# Patient Record
Sex: FEMALE | Race: WHITE | NOT HISPANIC OR LATINO
[De-identification: names, ages, dates, MRNs, and addresses within clinical notes are randomized per-mention and may not be internally consistent; named-entity substitution may affect disease eponyms.]

---

## 2017-01-25 ENCOUNTER — RX RENEWAL (OUTPATIENT)
Age: 62
End: 2017-01-25

## 2017-01-27 ENCOUNTER — LABORATORY RESULT (OUTPATIENT)
Age: 62
End: 2017-01-27

## 2017-04-04 ENCOUNTER — RX RENEWAL (OUTPATIENT)
Age: 62
End: 2017-04-04

## 2017-04-06 ENCOUNTER — MEDICATION RENEWAL (OUTPATIENT)
Age: 62
End: 2017-04-06

## 2017-04-06 DIAGNOSIS — E83.39 OTHER DISORDERS OF PHOSPHORUS METABOLISM: ICD-10-CM

## 2017-04-24 ENCOUNTER — MEDICATION RENEWAL (OUTPATIENT)
Age: 62
End: 2017-04-24

## 2017-04-24 DIAGNOSIS — I12.0 HYPERTENSIVE CHRONIC KIDNEY DISEASE WITH STAGE 5 CHRONIC KIDNEY DISEASE OR END STAGE RENAL DISEASE: ICD-10-CM

## 2017-04-24 DIAGNOSIS — N18.6 HYPERTENSIVE CHRONIC KIDNEY DISEASE WITH STAGE 5 CHRONIC KIDNEY DISEASE OR END STAGE RENAL DISEASE: ICD-10-CM

## 2017-04-24 RX ORDER — HYDRALAZINE HYDROCHLORIDE 25 MG/1
25 TABLET ORAL 3 TIMES DAILY
Qty: 90 | Refills: 3 | Status: ACTIVE | COMMUNITY
Start: 2017-04-24 | End: 1900-01-01

## 2017-05-01 ENCOUNTER — MEDICATION RENEWAL (OUTPATIENT)
Age: 62
End: 2017-05-01

## 2017-05-08 ENCOUNTER — MEDICATION RENEWAL (OUTPATIENT)
Age: 62
End: 2017-05-08

## 2017-05-31 ENCOUNTER — MEDICATION RENEWAL (OUTPATIENT)
Age: 62
End: 2017-05-31

## 2017-08-09 ENCOUNTER — LABORATORY RESULT (OUTPATIENT)
Age: 62
End: 2017-08-09

## 2017-08-21 ENCOUNTER — RX RENEWAL (OUTPATIENT)
Age: 62
End: 2017-08-21

## 2017-09-24 DIAGNOSIS — E83.51 HYPOCALCEMIA: ICD-10-CM

## 2017-09-24 DIAGNOSIS — N18.6 END STAGE RENAL DISEASE: ICD-10-CM

## 2017-09-24 DIAGNOSIS — Z99.2 END STAGE RENAL DISEASE: ICD-10-CM

## 2017-10-16 ENCOUNTER — LABORATORY RESULT (OUTPATIENT)
Age: 62
End: 2017-10-16

## 2017-10-16 PROBLEM — E83.51 HYPOCALCEMIA: Status: ACTIVE | Noted: 2017-10-16

## 2017-10-17 LAB
24R-OH-CALCIDIOL SERPL-MCNC: 11.5 PG/ML
25(OH)D3 SERPL-MCNC: 31.6 NG/ML

## 2017-10-23 ENCOUNTER — RX RENEWAL (OUTPATIENT)
Age: 62
End: 2017-10-23

## 2017-12-20 ENCOUNTER — MEDICATION RENEWAL (OUTPATIENT)
Age: 62
End: 2017-12-20

## 2018-01-02 ENCOUNTER — RX RENEWAL (OUTPATIENT)
Age: 63
End: 2018-01-02

## 2018-03-12 ENCOUNTER — RX RENEWAL (OUTPATIENT)
Age: 63
End: 2018-03-12

## 2018-05-05 ENCOUNTER — RX RENEWAL (OUTPATIENT)
Age: 63
End: 2018-05-05

## 2018-07-18 ENCOUNTER — LABORATORY RESULT (OUTPATIENT)
Age: 63
End: 2018-07-18

## 2018-09-20 ENCOUNTER — APPOINTMENT (OUTPATIENT)
Dept: OBGYN | Facility: CLINIC | Age: 63
End: 2018-09-20

## 2019-03-04 ENCOUNTER — MEDICATION RENEWAL (OUTPATIENT)
Age: 64
End: 2019-03-04

## 2019-03-21 ENCOUNTER — MEDICATION RENEWAL (OUTPATIENT)
Age: 64
End: 2019-03-21

## 2019-04-09 ENCOUNTER — APPOINTMENT (OUTPATIENT)
Dept: ENDOCRINOLOGY | Facility: CLINIC | Age: 64
End: 2019-04-09
Payer: MEDICARE

## 2019-04-09 VITALS
WEIGHT: 108 LBS | SYSTOLIC BLOOD PRESSURE: 122 MMHG | HEIGHT: 62.5 IN | BODY MASS INDEX: 19.38 KG/M2 | HEART RATE: 72 BPM | DIASTOLIC BLOOD PRESSURE: 62 MMHG | OXYGEN SATURATION: 98 %

## 2019-04-09 DIAGNOSIS — E89.0 POSTPROCEDURAL HYPOTHYROIDISM: ICD-10-CM

## 2019-04-09 DIAGNOSIS — N25.81 SECONDARY HYPERPARATHYROIDISM OF RENAL ORIGIN: ICD-10-CM

## 2019-04-09 PROCEDURE — 99205 OFFICE O/P NEW HI 60 MIN: CPT

## 2019-04-09 NOTE — ASSESSMENT
[Denosumab Therapy] : Risks  and benefits of denosumab therapy were discussed with the patient including eczema, cellulitis, osteonecrosis of the jaw and atypical femur fractures [FreeTextEntry1] : 63 year old female with \par \par 1. 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 Pt had L renal transplant in 2006 and failure in 2015. Pt was previously on chronic steroids with AVN of hip and THR in 2014. No h/o fx. Pt was started on dialysis after failure, being managed by Dr. Mckeon. March 2016 BMD was significantly low but at that time her kidney doctor deferred her treatment. BMD Nov 2018 is severe. Pt believes she was distantly treated for osteoporosis but she does not remember any of the details. She has had 2 CVA with memory loss. Pt ambulates with a 4 point walker. Pt is in PT 2x/wk for balance. Due to kidney disease; secondary hyperparathyroidism.  . Pt gets infusions of Vit D. \par \par Pt is at high risk for future fx and should be treated with medical therapy. Pt most likely has high-bone turnover suggested by high alk phos. I have requested that any recent vitamin D levels be sent for review. Pt most likely needs higher dose active vitamin D. Prolia would be the preferred drug for high-turnover osteoporosis once calcium is improved. \par \par Bone biopsy is the definitive diagnostic tool to assess bone health in ESRD but is  often not recommended with as labs can be done to characterize bone cell activity. I would request BSAP if not done recently as the best validated test in this setting; CTX and P!NP are less reliable in dialysis. \par \par 2. Hypothyroidism: pt had thyroidectomy for benign goiter in 2009 and is on an unusual regimen of Cytomel qd. This does not appear adequate for her as her labs are not well controlled. I would recommend pt transition to standard care with LT4 50. \par \par f/u TBD. \par Aly M, Leonel MCGEE, Evenkyleoecatarina P, et al. Executive summary of the 2017 KDIGO Chronic Kidney Disease-Mineral and Bone Disorder (CKD-MBD) Guideline Update: what's changed and why it matters. Kidney Int. 2017;92:26-36.  [Levothyroxine] : The patient was instructed to take Levothyroxine on an empty stomach, separate from vitamins, and wait at least 30 minutes before eating

## 2019-04-09 NOTE — HISTORY OF PRESENT ILLNESS
[Vitamin D (supplements)] : Vitamin D as a dietary supplement [Patient taking Meds Correctly] : Patient is taking meds correctly [Taking Steroids] : a history of taking steroids [Uses a Walker/Cane] : use of a walker/cane [Regular Dental Follow-Up] : regular dental follow-up [High Fall Risk] : high fall risk [FreeTextEntry1] : Ms. VERMA is a 63 year old female being referred today for severe osteoporosis. 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 Pt had L renal transplant in 2006 and failure in 2015. Pt was started on dialysis being managed by Dr. Mckeon. Pt was previously on chronic steroids with AVN of hip and THR in 2014. Secondary hyperparathyroidism; Ca 8.0, phos 6.9, , alk phos 167, no BSAP listed, no vitamin D levels submitted . On vit D with dialysis, no details available. \par \par  March 2016 BMD was significantly low but at that time her kidney doctor deferred her treatment. BMD Nov 2018 fem neck:-3.6 tot hip:-4.6 spine read as:-1.8 suspect arthritis. Pt believes she was distantly treated for osteoporosis but she does not remember any of the details. Pt ambulates with a 4 point walker. No h/o fx. No fhx of osteoporosis. Pt gets infusions of Vit D. Pt is in PT 2x/wk. \par h/o AVN and hip replacement felt due to chronic steroids but no h/o osteoporosis-related fractures. \par Endocrine hx notable for thyroidectomy for benign goiter in 2009 and is on an unusual regimen of Cytomel qd. \par \par Pt had two CVA with decreased memory. Pt was in a coma ~2016 due to pneumonia and then again with shingles and encephalitis (2 recurrences of this). \par \par Heart disease; had stents placed in 2012. Previously wore a wearable defibrillator. No current CP.  [Family History of Osteoporosis] : no family history of osteoporosis [Hyperparathyroidism] : hyperparathyroidism [Family History of Hip Fracture] : no family history of hip fracture [History of Radiation Therapy] : no history of radiation therapy [Previous Fragility Fracture] : no previous fragility fracture

## 2019-04-09 NOTE — REVIEW OF SYSTEMS
[Negative] : Heme/Lymph [Shortness Of Breath] : shortness of breath [Confusion] : confusion [Difficulty Walking] : difficulty walking [Poor Balance] : poor balance [FreeTextEntry5] : HTN

## 2019-04-09 NOTE — PHYSICAL EXAM
[Alert] : alert [No Acute Distress] : no acute distress [Well Nourished] : well nourished [Normal Sclera/Conjunctiva] : normal sclera/conjunctiva [EOMI] : extra ocular movement intact [No Proptosis] : no proptosis [Normal Oropharynx] : the oropharynx was normal [Thyroid Not Enlarged] : the thyroid was not enlarged [No Respiratory Distress] : no respiratory distress [No Accessory Muscle Use] : no accessory muscle use [Clear to Auscultation] : lungs were clear to auscultation bilaterally [Normal Rate] : heart rate was normal  [Normal S1, S2] : normal S1 and S2 [Regular Rhythm] : with a regular rhythm [Normal Bowel Sounds] : normal bowel sounds [Not Tender] : non-tender [Soft] : abdomen soft [Not Distended] : not distended [Post Cervical Nodes] : posterior cervical nodes [Anterior Cervical Nodes] : anterior cervical nodes [Axillary Nodes] : axillary nodes [Normal] : normal and non tender [No Spinal Tenderness] : no spinal tenderness [No Stigmata of Cushings Syndrome] : no stigmata of cushings syndrome [Normal Strength/Tone] : muscle strength and tone were normal [No Rash] : no rash [Normal Reflexes] : deep tendon reflexes were 2+ and symmetric [Oriented x3] : oriented to person, place, and time [Kyphosis] : kyphosis present [Well Healed Scar] : well healed scar [Acanthosis Nigricans] : no acanthosis nigricans [de-identified] : using wheeled walker, unable to climb to exam table without assistance [de-identified] : decreased truncal ht [de-identified] : poor memory

## 2019-04-09 NOTE — CONSULT LETTER
[Consult Letter:] : I had the pleasure of evaluating your patient, [unfilled]. [Please see my note below.] : Please see my note below. [Consult Closing:] : Thank you very much for allowing me to participate in the care of this patient.  If you have any questions, please do not hesitate to contact me. [Sincerely,] : Sincerely, [Dear  ___] : Dear  [unfilled], [FreeTextEntry2] : Isra Brunner MD\par [address] [facility] [address], \par [place] [address] [DrDylan  ___] : Dr. BYRNE

## 2019-04-09 NOTE — END OF VISIT
[FreeTextEntry3] : I, Vicki De La Paz, authored this note working as a medical scribe for Dr. Chauhan.  04/09/2019.  1:15PM. \par This note was authored by Vicki De La Paz working as medical scribe for me. I have reviewed, edited, and revised the note as needed. I am in agreement with the exam findings, imaging findings, and treatment plan.  Pb Chauhan MD

## 2019-04-12 ENCOUNTER — OTHER (OUTPATIENT)
Age: 64
End: 2019-04-12

## 2019-04-12 DIAGNOSIS — M81.0 AGE-RELATED OSTEOPOROSIS W/OUT CURRENT PATHOLOGICAL FRACTURE: ICD-10-CM

## 2019-05-28 ENCOUNTER — RX RENEWAL (OUTPATIENT)
Age: 64
End: 2019-05-28

## 2019-05-28 RX ORDER — ALLOPURINOL 100 MG/1
100 TABLET ORAL
Qty: 90 | Refills: 3 | Status: ACTIVE | COMMUNITY
Start: 2017-12-20 | End: 1900-01-01

## 2019-08-22 ENCOUNTER — MEDICATION RENEWAL (OUTPATIENT)
Age: 64
End: 2019-08-22

## 2019-10-25 ENCOUNTER — MEDICATION RENEWAL (OUTPATIENT)
Age: 64
End: 2019-10-25

## 2020-01-08 ENCOUNTER — MEDICATION RENEWAL (OUTPATIENT)
Age: 65
End: 2020-01-08

## 2020-02-03 DIAGNOSIS — Z01.818 ENCOUNTER FOR OTHER PREPROCEDURAL EXAMINATION: ICD-10-CM

## 2020-02-04 ENCOUNTER — APPOINTMENT (OUTPATIENT)
Dept: TRANSPLANT | Facility: CLINIC | Age: 65
End: 2020-02-04
Payer: COMMERCIAL

## 2020-02-04 ENCOUNTER — APPOINTMENT (OUTPATIENT)
Dept: NEPHROLOGY | Facility: CLINIC | Age: 65
End: 2020-02-04
Payer: COMMERCIAL

## 2020-02-04 VITALS
BODY MASS INDEX: 19.02 KG/M2 | TEMPERATURE: 97.3 F | DIASTOLIC BLOOD PRESSURE: 82 MMHG | HEIGHT: 62.5 IN | RESPIRATION RATE: 14 BRPM | WEIGHT: 106 LBS | HEART RATE: 73 BPM | SYSTOLIC BLOOD PRESSURE: 185 MMHG | OXYGEN SATURATION: 98 %

## 2020-02-04 PROCEDURE — 99204 OFFICE O/P NEW MOD 45 MIN: CPT

## 2020-02-04 PROCEDURE — 99205 OFFICE O/P NEW HI 60 MIN: CPT

## 2020-02-04 NOTE — ASSESSMENT
[FreeTextEntry1] : \par Assessment \par Ms. JORGE VERMA is a 64 year year old woman evaluated for kidney transplantation. \par The etiology or her kidney disease is IgA nephropathy and TTP. She had LRD transplant from her son in 2006 that failed in 2015 and she is back on hemodialysis. She is doing very poorly on hemodialysis. She is unable to tolerate full session and prescribed ultrafiltration due to low blood pressure and cramping. She has frequent ED visits for fluid overload requiring emergent dialysis. \par Kidney transplant in 2006 -failed in 2015 in the setting of antibiotic/antifungal therapy for severe infection of abdominal wall. She has had severe infections in the past few years including varicella encephalitis, pneumonia, UTI, cellulitis. \par She appears frail and pale.  BMI 19. She has poor mobility and functional status. She is mostly sedentary, ambulates with a cane or walker due to balance problems. She  also has memory problems and is dependent on her  for medications, iADLs, doctors appointments etc. \par Cardiac w/u - history of CAD in 2012 s/p stent. Had low EF requiring life vest but EF improved to >40% and she is now off life vest. Echo report not on file. \par History of CVA x2 - described as ischemic stroke in the setting of uncontrolled hypertension. Residual memory and balance problems. \par \par I spoke to Mrs Verma and her  at length. They wish to pursue kidney transplantation at this time because she is not doing well on hemodialysis. However they are also concerned about the infectious complications of immunosuppressive medications given her history of recurrent severe infections. They have been delaying evaluation for re transplant because she was medically unstable in the past 5 years. She is now improving, has gained some weight and is no longer getting recurrent infections. They are also concerned about the mortality of staying on hemodialysis. They are encouraged by the fact that her initial kidney functioned well for almost 10 years. Hence they would like to pursue kidney transplant evaluation. She does not wish to receive a kidney from her other 2 children. There are no living donors at this time. \par \par In view of her multiple comorbidities and her poor functional status i feel she is a marginal candidate at best. \par She will be presented for review by the multidisciplinary listing committee. We will proceed with further testing only if  the committee decides she is a candidate for kidney transplantation. I explained this in detail to Mrs Verma and her , they both verbalized understanding.

## 2020-02-04 NOTE — HISTORY OF PRESENT ILLNESS
[TextBox_42] : \par Ms. 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 \par She was initially diagnosed with IgA nephropathy via kidney biopsy in . She was treated with steroids and serum creatinine remained stable ~ 3mg/dL for about 10 years until she presented with TTP  in  with subsequent ESRD. She was treated with plasmapheresis, rituximab and steroids with poor response. \par She was initiated on hemodialysis in 2005 for about 18 months then received a LRD kidney form her son in  at [place]. She was maintained on prograf and cellcept and kidney lasted for 10 years.  She did have diarrhea on cellcept which she controlled with immodium. \par \par In  she had right total hip replacement for avascular necrosis of the femoral head. Course was complicated by infection in the leg requiring prolonged hospital stay and antibiotic therapy. He functional status and overall health deteriorated significantly following this episode. \par \par In  she presented with severe bacterial and fungal infection in the lower abdominal wall. (?flesh eating bacteria/fungus) She was treated with antibiotics and antifungal agents and her transplant kidney failed as a result of medication toxicity. She required surgical debridement and had an open wound for prolonged period that was treated with packing. \par \par She has had a complicated course since 2016 with multiple hospitalizations for infections - pneumonia, UTI, cellulitis, varicella encephalitis x2 \par In 2016, She was initiated back on dialysis and she currently received dialysis on MWF at [facility] in [place]. \par Her dialysis access is right UE AV fistula that has been functioning well. She is only able to receive dialysis for 2.5 hours, she is often unable to complete the full prescribed 3 hours due to cramping and low blood pressure. She has recurrent ER visits with shortness of breath due to fluid overload and receives dialysis treatment. She was started on 4 times a week dialysis due to her issues with fluid but she did not wish to continue it. She is trying to limit her fluid intake instead. She is anuric. . \par \par PMH includes\par Hypertension diagnosed in   \par CAD s/p stent x3  in , previously on Brilinta. D/madelyn due to anemia and concern for bleeding. \par CHF -previously wore a life vest due to low EF. Removed when her EF reached > 40%. [facility] report not available. \par CVA x2 with residual memory and balance problems. \par Avascular necrosis of the hip with THR in . 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 \par Post transplant diabetes diagnosed 6-7 years ago, temporarily required insulin. Not on any rx for diabetes since reinitiation of dialysis. \par 2018 - Vomiting and 30lb weight loss s/p EGD and colonoscopy at [facility]. Advised gluten free diet with improvement in her symptoms.  Now back eating gluten and tolerating well. \par Hypothyroidism following total thyroidectomy in , Gout no recent flares, off medications, Severe osteoporosis\par LLE DVT in  \par History of skin cancer right lower face s/p resection 6-7 years ago. \par Anemia due to CKD, no history of GI bleed. \par \par PSH \par Kidney transplant , thyroidectomy for goiter in , Right hip replacement , right facial skin cancer resection  6-7 yrs ago, AV fistula x2  \par \par No known history of peripheral vascular disease, no claudications \par No history of bleeding problems , no bruising, \par No history of Seizures \par No history of lung problems including COPD, Asthma, Sleep apnea,  bronchitis. \par No history of kidney stones, voiding problems. Has had recurrent UTIs.  \par No history of cancer other than skin cancer resected 6-7 yrs ago\par No history of viral infections such as hepatitis or HIV, no history of tuberculosis \par \par Hospitalizations \par Recurrent ED visits for fluid overload requiring dialysis 4-5 times in the past year. \par 2018 - Vomiting and 30lb weight loss s/p EGD and colonoscopy at [facility]. Advised gluten free diet. \par \par Exercise tolerance  - She leads a sedentary life, does not usually leave the house except to go to dialysis. She ambulates with a walker or a cane. They have a flight of stairs with 14 steps in the house with 2 banisters, she is able to climb up the stairs. Does feel short of breath but is able to complete without stopping. She has no chest pain or calf pain on ambulation. \par \par Sensitizing events - several blood transfusions, kidney transplant, 3 children \par \par Preventive Med \par Colonoscopy -   2-3 years ago, reportedly normal \par PAP - not recently \par Mammogram - done last year by PMD \par \par Potential live donors - None \par \par Family HIstory:- \par Father  : DM , heart disease -  from heart disease 62 \par Mother  :  at 90, heart problems  and kidney problems in old age\par Siblings : Had 1 sister with DM passed away at age 50. No other siblings.  \par 3 Children age 41(former kidney donor) , 38, 35 . No medical problems \par \par Social history:- , Lives with her  who is her primary care taker. Independent with toileting, showering and dressing. Does not cook, does not drive. Does not leave the house alone because of unsteady gait and disorientation. She has memory and attention problems. \par Never smoked, no alcohol, no drugs. 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 \par \par Medications \par Cytomel  \par Aspirin \par Metoprolol\par Norvasc\par Lipitor

## 2020-02-04 NOTE — REVIEW OF SYSTEMS
[Fatigue] : fatigue [Double vision] : double vision [Wears glasses] : wears glasses [Blurred Vision] : blurred vision [Dyspnea on Exertion] : dyspnea on exertion [Diarrhea] : diarrhea [Dizziness] : dizziness [Memory Loss] : memory loss [Unsteady Gait] : unsteady gait [Anemia] : anemia [Fever] : no fever [Recent Weight Gain (___ Lbs)] : no recent weight gain [Recent Weight Loss (___ Lbs)] : no recent weight loss [Sore throat] : no sore throat [Sclera anicteric] : sclera icteric [SOB] : no shortness of breath [Wheezing] : no wheezing [Cough] : no cough [Pleuritic Chest Pain] : no pleuritic chest pain [Abdominal Pain] : no abdominal pain [Nausea] : no nausea [Constipation] : no constipation [Vomiting] : no vomiting [Joint Pain] : no joint pain [Tattoos] : no tattoos [Itching] : no itching [Skin Rash] : no skin rash [Headache] : no headache [Fainting] : no fainting [Confusion] : no confusion [Seizures] : no seizures [Suicidal] : not suicidal [Anxiety] : no anxiety [Depression] : no depression [Adenopathy] : no adenopathy [Easy Bleeding] : no easy bleeding [Easy Bruising] : no easy bruising [FreeTextEntry7] : occasional diarrhea - Imodium  [FreeTextEntry8] : Dick [de-identified] : Off Brilinta due to GI bleed

## 2020-07-03 ENCOUNTER — RX RENEWAL (OUTPATIENT)
Age: 65
End: 2020-07-03

## 2021-07-20 ENCOUNTER — APPOINTMENT (OUTPATIENT)
Dept: OPHTHALMOLOGY | Facility: CLINIC | Age: 66
End: 2021-07-20
Payer: MEDICARE

## 2021-07-20 ENCOUNTER — NON-APPOINTMENT (OUTPATIENT)
Age: 66
End: 2021-07-20

## 2021-07-20 PROCEDURE — 92004 COMPRE OPH EXAM NEW PT 1/>: CPT

## 2021-07-20 PROCEDURE — 92134 CPTRZ OPH DX IMG PST SGM RTA: CPT

## 2021-08-10 ENCOUNTER — NON-APPOINTMENT (OUTPATIENT)
Age: 66
End: 2021-08-10

## 2021-08-10 ENCOUNTER — APPOINTMENT (OUTPATIENT)
Dept: OPHTHALMOLOGY | Facility: CLINIC | Age: 66
End: 2021-08-10
Payer: MEDICARE

## 2021-08-10 PROCEDURE — 92012 INTRM OPH EXAM EST PATIENT: CPT

## 2021-11-15 DIAGNOSIS — M10.9 GOUT, UNSPECIFIED: ICD-10-CM

## 2021-11-15 RX ORDER — COLCHICINE 0.6 MG/1
0.6 TABLET ORAL
Qty: 30 | Refills: 3 | Status: ACTIVE | COMMUNITY
Start: 2021-11-15 | End: 1900-01-01

## 2022-01-03 ENCOUNTER — APPOINTMENT (OUTPATIENT)
Dept: OPHTHALMOLOGY | Facility: CLINIC | Age: 67
End: 2022-01-03

## 2022-03-21 ENCOUNTER — RX RENEWAL (OUTPATIENT)
Age: 67
End: 2022-03-21

## 2022-06-27 RX ORDER — AMLODIPINE BESYLATE 5 MG/1
5 TABLET ORAL
Qty: 45 | Refills: 3 | Status: ACTIVE | COMMUNITY
Start: 2017-05-31 | End: 1900-01-01

## 2022-12-14 ENCOUNTER — RX RENEWAL (OUTPATIENT)
Age: 67
End: 2022-12-14

## 2023-01-19 RX ORDER — CALCIUM ACETATE 667 MG/1
667 CAPSULE ORAL
Qty: 360 | Refills: 3 | Status: ACTIVE | COMMUNITY
Start: 2017-04-06 | End: 1900-01-01

## 2023-02-08 ENCOUNTER — RX RENEWAL (OUTPATIENT)
Age: 68
End: 2023-02-08

## 2023-12-29 ENCOUNTER — RX RENEWAL (OUTPATIENT)
Age: 68
End: 2023-12-29

## 2024-05-09 ENCOUNTER — APPOINTMENT (OUTPATIENT)
Dept: MAMMOGRAPHY | Facility: CLINIC | Age: 69
End: 2024-05-09
Payer: MEDICARE

## 2024-05-09 ENCOUNTER — APPOINTMENT (OUTPATIENT)
Dept: RADIOLOGY | Facility: CLINIC | Age: 69
End: 2024-05-09
Payer: MEDICARE

## 2024-05-09 PROCEDURE — 77067 SCR MAMMO BI INCL CAD: CPT

## 2024-05-09 PROCEDURE — 77063 BREAST TOMOSYNTHESIS BI: CPT

## 2024-05-09 PROCEDURE — 77080 DXA BONE DENSITY AXIAL: CPT

## 2024-06-18 ENCOUNTER — APPOINTMENT (OUTPATIENT)
Dept: ULTRASOUND IMAGING | Facility: CLINIC | Age: 69
End: 2024-06-18
Payer: MEDICARE

## 2024-06-18 PROCEDURE — 76642 ULTRASOUND BREAST LIMITED: CPT | Mod: RT

## 2024-09-24 ENCOUNTER — APPOINTMENT (OUTPATIENT)
Dept: ULTRASOUND IMAGING | Facility: CLINIC | Age: 69
End: 2024-09-24